# Patient Record
Sex: FEMALE | Race: WHITE | NOT HISPANIC OR LATINO | Employment: OTHER | URBAN - METROPOLITAN AREA
[De-identification: names, ages, dates, MRNs, and addresses within clinical notes are randomized per-mention and may not be internally consistent; named-entity substitution may affect disease eponyms.]

---

## 2024-01-21 DIAGNOSIS — B00.9 HSV INFECTION: Primary | ICD-10-CM

## 2024-01-25 RX ORDER — VALACYCLOVIR HYDROCHLORIDE 500 MG/1
TABLET, FILM COATED ORAL
Qty: 90 TABLET | Refills: 3 | Status: SHIPPED | OUTPATIENT
Start: 2024-01-25

## 2024-06-20 ENCOUNTER — APPOINTMENT (OUTPATIENT)
Dept: PAIN MEDICINE | Facility: CLINIC | Age: 59
End: 2024-06-20
Payer: MEDICARE

## 2024-06-20 VITALS
HEIGHT: 63 IN | RESPIRATION RATE: 17 BRPM | SYSTOLIC BLOOD PRESSURE: 122 MMHG | HEART RATE: 83 BPM | BODY MASS INDEX: 23.92 KG/M2 | DIASTOLIC BLOOD PRESSURE: 75 MMHG | WEIGHT: 135 LBS

## 2024-06-20 DIAGNOSIS — M06.9 RHEUMATOID ARTHRITIS INVOLVING BOTH HANDS, UNSPECIFIED WHETHER RHEUMATOID FACTOR PRESENT (MULTI): ICD-10-CM

## 2024-06-20 DIAGNOSIS — M79.2 NEUROPATHIC PAIN OF ANKLE, RIGHT: Primary | ICD-10-CM

## 2024-06-20 DIAGNOSIS — M72.2 PLANTAR FASCIITIS: ICD-10-CM

## 2024-06-20 DIAGNOSIS — M79.7 FIBROMYALGIA: ICD-10-CM

## 2024-06-20 PROCEDURE — 99204 OFFICE O/P NEW MOD 45 MIN: CPT | Performed by: PAIN MEDICINE

## 2024-06-20 RX ORDER — ATORVASTATIN CALCIUM 20 MG/1
20 TABLET, FILM COATED ORAL
COMMUNITY
Start: 2024-03-20

## 2024-06-20 RX ORDER — METFORMIN HYDROCHLORIDE 1000 MG/1
1000 TABLET ORAL 2 TIMES DAILY
COMMUNITY

## 2024-06-20 RX ORDER — SUMATRIPTAN 20 MG/1
SPRAY NASAL
COMMUNITY
Start: 2015-05-01

## 2024-06-20 RX ORDER — DEXTROAMPHETAMINE SACCHARATE, AMPHETAMINE ASPARTATE MONOHYDRATE, DEXTROAMPHETAMINE SULFATE AND AMPHETAMINE SULFATE 7.5; 7.5; 7.5; 7.5 MG/1; MG/1; MG/1; MG/1
30 CAPSULE, EXTENDED RELEASE ORAL
COMMUNITY
Start: 2024-06-15 | End: 2024-07-15

## 2024-06-20 RX ORDER — DICLOFENAC SODIUM 10 MG/G
GEL TOPICAL
COMMUNITY
Start: 2024-05-07

## 2024-06-20 RX ORDER — TRAZODONE HYDROCHLORIDE 300 MG/1
TABLET ORAL
COMMUNITY

## 2024-06-20 RX ORDER — PREGABALIN 75 MG/1
225 CAPSULE ORAL 2 TIMES DAILY
COMMUNITY
Start: 2024-05-14 | End: 2024-11-10

## 2024-06-20 RX ORDER — BREXPIPRAZOLE 3 MG/1
1 TABLET ORAL DAILY
COMMUNITY

## 2024-06-20 RX ORDER — CELECOXIB 200 MG/1
CAPSULE ORAL
COMMUNITY

## 2024-06-20 RX ORDER — DICLOFENAC SODIUM 75 MG/1
75 TABLET, DELAYED RELEASE ORAL
COMMUNITY
Start: 2019-05-29

## 2024-06-20 RX ORDER — DEXTROAMPHETAMINE SACCHARATE, AMPHETAMINE ASPARTATE MONOHYDRATE, DEXTROAMPHETAMINE SULFATE AND AMPHETAMINE SULFATE 2.5; 2.5; 2.5; 2.5 MG/1; MG/1; MG/1; MG/1
10 CAPSULE, EXTENDED RELEASE ORAL
COMMUNITY
Start: 2024-06-15 | End: 2024-07-15

## 2024-06-20 RX ORDER — MULTIVITAMIN
TABLET ORAL
COMMUNITY

## 2024-06-20 RX ORDER — OMEPRAZOLE 20 MG/1
CAPSULE, DELAYED RELEASE ORAL
COMMUNITY

## 2024-06-20 SDOH — SOCIAL STABILITY: SOCIAL NETWORK: SOCIAL ACTIVITY:: 7

## 2024-06-20 ASSESSMENT — PAIN SCALES - GENERAL
PAINLEVEL_OUTOF10: 9
PAINLEVEL: 9

## 2024-06-20 ASSESSMENT — ENCOUNTER SYMPTOMS: PAIN: 1

## 2024-06-20 ASSESSMENT — PAIN - FUNCTIONAL ASSESSMENT: PAIN_FUNCTIONAL_ASSESSMENT: 0-10

## 2024-06-20 NOTE — PROGRESS NOTES
Subjective   Patient ID: Syeda Rao is a 59 y.o. female who presents for Pain (Pt here today c/o pain in feet, hands and back. Recent images, no recent PT. ).  Pain      This is a 59-year-old female presenting as a new patient in our clinic today. Patient has history of rheumatoid arthritis as well as fibromyalgia. She complains of significant right ankle and leg pain as well as left foot pain in the sole. She had to ankle surgeries in the past 1 7 years ago and the most recent one being in January on the right ankle. She says that a second surgery was required due to entrapment of the nerve. Since the first surgery 7 years ago she was reporting numbness and tingling as well as significant sensitivity to touch and burning pain of the right ankle going into the dorsum of the foot as well as the toes. This is caused her significant distress as well as limited function in her daily life. As far as her left foot pain she says the pain is localized to the sole and has made ambulation difficult for her she does wear inserts in her shoes. She takes Lyrica 75 mg 3 times a day which she says does not help with her pain. She has not had any injections nor any physical therapy in the past for this.            Pain Disability Index  Family/Home Responsibilities:: 7  Recreation:: 9  Social Activity:: 7  Occupation:: 9  Sexual Behavior:: 9  Self Care:: 3  Life-Support Activities:: 2         Review of Systems   All other systems reviewed and are negative.       Current Outpatient Medications   Medication Instructions    amphetamine-dextroamphetamine XR (Adderall XR) 10 mg 24 hr capsule 10 mg, oral, Daily RT    amphetamine-dextroamphetamine XR (Adderall XR) 30 mg 24 hr capsule 30 mg, oral, Daily RT    atorvastatin (LIPITOR) 20 mg, oral, Daily RT    celecoxib (CeleBREX) 200 mg capsule TAKE ONE CAPSULE BY MOUTH TWICE DAILY @9AM & 5PM (vial)    diclofenac (VOLTAREN) 75 mg, oral    diclofenac sodium (Voltaren) 1 % gel apply 2  grams topically to affected area four times daily for 10 days    metFORMIN (GLUCOPHAGE) 1,000 mg, oral, 2 times daily    multivitamin tablet oral    omeprazole (PriLOSEC) 20 mg DR capsule TAKE ONE CAPSULE BY MOUTH DAILY AT 9AM (vial)    pregabalin (LYRICA) 225 mg, oral, 2 times daily    Rexulti 3 mg tablet 1 tablet, oral, Daily    SUMAtriptan (Imitrex) 20 mg/actuation nasal spray use 1 spray in the nose as needed.    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    Common variable hypogammaglobulinemia    Other somatoform disorders     Bruxism (teeth grinding)    Other specified noninflammatory disorders of vagina 05/01/2017    Vaginal odor    Other symptoms and signs involving the musculoskeletal system 07/11/2018    Bilateral arm weakness    Pain in right hand     Right hand pain    Periodic limb movement disorder     Periodic limb movement    Personal history of diseases of the skin and subcutaneous tissue     History of acne    Personal history of other diseases of the musculoskeletal system and connective tissue     History of arthritis    Personal history of other diseases of the musculoskeletal system and connective tissue     History of low back pain    Personal history of other diseases of the nervous system and sense organs 08/30/2018    History of benign essential tremor    Personal history of other diseases of the nervous system and sense organs     History of migraine    Personal history of other diseases of the nervous system and sense organs     History of conjunctivitis    Personal history of other diseases of the respiratory system     Personal history of chronic sinusitis    Personal history of other diseases of the respiratory system     History of acute  bronchitis    Personal history of other endocrine, nutritional and metabolic disease     History of high cholesterol    Personal history of other mental and behavioral disorders 10/07/2015    History of depression    Personal history of other specified conditions 07/11/2018    History of fatigue    Unspecified symptoms and signs involving the genitourinary system     UTI symptoms    Ventricular premature depolarization     PVC (premature ventricular contraction)        Past Surgical History:   Procedure Laterality Date    CT ANGIO NECK  12/24/2022    CT NECK ANGIO W AND WO IV CONTRAST 12/24/2022 [facility]    MR HEAD ANGIO WO IV CONTRAST  3/7/2018    MR HEAD ANGIO WO IV CONTRAST 3/7/2018 [facility]    OTHER SURGICAL HISTORY  03/19/2018    CO2 Laser Ablation Of Vaginal Lesion    OTHER SURGICAL HISTORY  10/18/2019    Cystoscopy    OTHER SURGICAL HISTORY  04/12/2019    Shave biopsy    OTHER SURGICAL HISTORY  03/11/2014    Transurethral Removal Of Obstruction Fr Ureter, Renal Pelvis        No family history on file.     No Known Allergies     Objective     Vitals:    06/20/24 1321   BP: 122/75   Pulse: 83   Resp: 17        Physical Exam    GENERAL EXAM  Vital Signs: Vital signs to include heart rate, respiration rate, blood pressure, and temperature were reviewed.  General Appearance:  Awake, alert, healthy appearing, well developed, No acute distress.  Head: Normocephalic without evidence of head injury.  Neck: The appearance of the neck was normal without swelling with a midline trachea.  Eyes: The eyelids and eyebrows exhibited no abnormalities.  Pupils were not pin-point.  Sclera was without icterus.  Lungs: Respiration rhythm and depth was normal.  Respiratory movements were normal without labored breathing.  Cardiovascular: No peripheral edema was present.    Neurological: Patient was oriented to time, place, and person.  Speech was normal.  Balance, gait, and stance were unremarkable.     Psychiatric: Appearance was normal with appropriate dress.  Mood was euthymic and affect was normal.  Skin: Affected regions were without ecchymosis or skin lesions.        Physical exam as above except:  No discolorations, swelling, temperature changes, or limited range of mobility of the right ankle compared to the left.  Significant sensitivity to touch along the surgical scar on the lateral malleolus up towards the distal fibular region.    In office injection:  After obtaining informed consent the patient was vision in the left lateral decubitus position. The surgical scar of the right malleolus was identified, prepped and draped in usual sterile fashion. A 25-gauge needle was introduced into the subcuticular layer of the surgical scar, and after negative aspiration 3 mL of 0.75% bupivacaine as well as 20 mg of Depo-Medrol was injected with no complications. Additionally, the left plantar fascia was palpated, draped and prepped in usual sterile fashion. A 25-gauge needle was injected along with 2 mL of 0.75% bupivacaine and 20 mg of Depo-Medrol.      Assessment/Plan   Diagnoses and all orders for this visit:  Neuropathic pain of ankle, right  Plantar fasciitis  Fibromyalgia  Rheumatoid arthritis involving both hands, unspecified whether rheumatoid factor present (Multi)  This is a 61-year-old female presenting as new patient in our clinic today. Patient has history of fibromyalgia, rheumatoid arthritis, neuropathic pain of the right ankle as well as pendular fasciitis of left foot. Patient underwent an in office injection of the right surgical scar in the ankle as well as left plantar fascia. The patient tolerated procedure well and endorsed complete relief of the pain along the surgical scar with some mild pain of the left plantar fascia. The plan is as follows:  1. Patient be started on Cymbalta 30-60 mg she was counseled on side effects this medication and she is agreeable  2. Patient was also referred to  physical therapy for both feet to help improve range of motion and overall pain symptoms         This note was generated with the aid of dictation software, there may be typos despite my attempts at proofreading.

## 2024-07-08 ENCOUNTER — APPOINTMENT (OUTPATIENT)
Dept: PAIN MEDICINE | Facility: CLINIC | Age: 59
End: 2024-07-08
Payer: MEDICARE

## 2024-11-13 DIAGNOSIS — D83.9 CVID (COMMON VARIABLE IMMUNODEFICIENCY): ICD-10-CM

## 2024-11-13 DIAGNOSIS — B99.9 RECURRENT INFECTIONS: Primary | ICD-10-CM

## 2025-02-03 ENCOUNTER — APPOINTMENT (OUTPATIENT)
Dept: RADIOLOGY | Facility: HOSPITAL | Age: 60
End: 2025-02-03
Payer: MEDICARE

## 2025-02-03 ENCOUNTER — HOSPITAL ENCOUNTER (EMERGENCY)
Facility: HOSPITAL | Age: 60
Discharge: HOME | End: 2025-02-03
Attending: EMERGENCY MEDICINE
Payer: MEDICARE

## 2025-02-03 ENCOUNTER — APPOINTMENT (OUTPATIENT)
Dept: CARDIOLOGY | Facility: HOSPITAL | Age: 60
End: 2025-02-03
Payer: MEDICARE

## 2025-02-03 VITALS
WEIGHT: 140 LBS | TEMPERATURE: 97.9 F | DIASTOLIC BLOOD PRESSURE: 74 MMHG | HEART RATE: 75 BPM | BODY MASS INDEX: 24.8 KG/M2 | HEIGHT: 63 IN | RESPIRATION RATE: 22 BRPM | OXYGEN SATURATION: 95 % | SYSTOLIC BLOOD PRESSURE: 128 MMHG

## 2025-02-03 DIAGNOSIS — R11.0 NAUSEA: Primary | ICD-10-CM

## 2025-02-03 DIAGNOSIS — N30.00 ACUTE CYSTITIS WITHOUT HEMATURIA: ICD-10-CM

## 2025-02-03 DIAGNOSIS — R10.30 LOWER ABDOMINAL PAIN: ICD-10-CM

## 2025-02-03 DIAGNOSIS — R52 BODY ACHES: ICD-10-CM

## 2025-02-03 LAB
ALBUMIN SERPL BCP-MCNC: 4.2 G/DL (ref 3.4–5)
ALP SERPL-CCNC: 117 U/L (ref 33–136)
ALT SERPL W P-5'-P-CCNC: 29 U/L (ref 7–45)
ANION GAP SERPL CALC-SCNC: 14 MMOL/L (ref 10–20)
APPEARANCE UR: CLEAR
AST SERPL W P-5'-P-CCNC: 31 U/L (ref 9–39)
BASOPHILS # BLD AUTO: 0.05 X10*3/UL (ref 0–0.1)
BASOPHILS NFR BLD AUTO: 0.6 %
BILIRUB SERPL-MCNC: 0.3 MG/DL (ref 0–1.2)
BILIRUB UR STRIP.AUTO-MCNC: NEGATIVE MG/DL
BNP SERPL-MCNC: 12 PG/ML (ref 0–99)
BUN SERPL-MCNC: 18 MG/DL (ref 6–23)
CALCIUM SERPL-MCNC: 9.9 MG/DL (ref 8.6–10.3)
CARDIAC TROPONIN I PNL SERPL HS: 5 NG/L (ref 0–13)
CARDIAC TROPONIN I PNL SERPL HS: 6 NG/L (ref 0–13)
CHLORIDE SERPL-SCNC: 104 MMOL/L (ref 98–107)
CO2 SERPL-SCNC: 26 MMOL/L (ref 21–32)
COLOR UR: YELLOW
CREAT SERPL-MCNC: 0.88 MG/DL (ref 0.5–1.05)
D DIMER PPP FEU-MCNC: 351 NG/ML FEU
EGFRCR SERPLBLD CKD-EPI 2021: 75 ML/MIN/1.73M*2
EOSINOPHIL # BLD AUTO: 0.29 X10*3/UL (ref 0–0.7)
EOSINOPHIL NFR BLD AUTO: 3.4 %
ERYTHROCYTE [DISTWIDTH] IN BLOOD BY AUTOMATED COUNT: 14.8 % (ref 11.5–14.5)
FLUAV RNA RESP QL NAA+PROBE: NOT DETECTED
FLUBV RNA RESP QL NAA+PROBE: NOT DETECTED
GLUCOSE SERPL-MCNC: 98 MG/DL (ref 74–99)
GLUCOSE UR STRIP.AUTO-MCNC: NEGATIVE MG/DL
HCT VFR BLD AUTO: 45.2 % (ref 36–46)
HGB BLD-MCNC: 14.6 G/DL (ref 12–16)
IMM GRANULOCYTES # BLD AUTO: 0.03 X10*3/UL (ref 0–0.7)
IMM GRANULOCYTES NFR BLD AUTO: 0.4 % (ref 0–0.9)
KETONES UR STRIP.AUTO-MCNC: NEGATIVE MG/DL
LEUKOCYTE ESTERASE UR QL STRIP.AUTO: ABNORMAL
LIPASE SERPL-CCNC: 100 U/L (ref 9–82)
LYMPHOCYTES # BLD AUTO: 2.1 X10*3/UL (ref 1.2–4.8)
LYMPHOCYTES NFR BLD AUTO: 24.6 %
MAGNESIUM SERPL-MCNC: 1.99 MG/DL (ref 1.6–2.4)
MCH RBC QN AUTO: 28.1 PG (ref 26–34)
MCHC RBC AUTO-ENTMCNC: 32.3 G/DL (ref 32–36)
MCV RBC AUTO: 87 FL (ref 80–100)
MONOCYTES # BLD AUTO: 0.6 X10*3/UL (ref 0.1–1)
MONOCYTES NFR BLD AUTO: 7 %
MUCOUS THREADS #/AREA URNS AUTO: ABNORMAL /LPF
NEUTROPHILS # BLD AUTO: 5.47 X10*3/UL (ref 1.2–7.7)
NEUTROPHILS NFR BLD AUTO: 64 %
NITRITE UR QL STRIP.AUTO: NEGATIVE
NRBC BLD-RTO: 0 /100 WBCS (ref 0–0)
PH UR STRIP.AUTO: 6 [PH]
PLATELET # BLD AUTO: 268 X10*3/UL (ref 150–450)
POTASSIUM SERPL-SCNC: 4.3 MMOL/L (ref 3.5–5.3)
PROT SERPL-MCNC: 7.4 G/DL (ref 6.4–8.2)
PROT UR STRIP.AUTO-MCNC: ABNORMAL MG/DL
RBC # BLD AUTO: 5.2 X10*6/UL (ref 4–5.2)
RBC # UR STRIP.AUTO: NEGATIVE /UL
RBC #/AREA URNS AUTO: ABNORMAL /HPF
SARS-COV-2 RNA RESP QL NAA+PROBE: NOT DETECTED
SODIUM SERPL-SCNC: 140 MMOL/L (ref 136–145)
SP GR UR STRIP.AUTO: 1.02
SQUAMOUS #/AREA URNS AUTO: ABNORMAL /HPF
UROBILINOGEN UR STRIP.AUTO-MCNC: ABNORMAL MG/DL
WBC # BLD AUTO: 8.5 X10*3/UL (ref 4.4–11.3)
WBC #/AREA URNS AUTO: ABNORMAL /HPF

## 2025-02-03 PROCEDURE — 36415 COLL VENOUS BLD VENIPUNCTURE: CPT

## 2025-02-03 PROCEDURE — 96374 THER/PROPH/DIAG INJ IV PUSH: CPT

## 2025-02-03 PROCEDURE — 99285 EMERGENCY DEPT VISIT HI MDM: CPT | Mod: 25 | Performed by: EMERGENCY MEDICINE

## 2025-02-03 PROCEDURE — 71045 X-RAY EXAM CHEST 1 VIEW: CPT

## 2025-02-03 PROCEDURE — 87086 URINE CULTURE/COLONY COUNT: CPT | Mod: GEALAB

## 2025-02-03 PROCEDURE — 84484 ASSAY OF TROPONIN QUANT: CPT

## 2025-02-03 PROCEDURE — 71045 X-RAY EXAM CHEST 1 VIEW: CPT | Performed by: RADIOLOGY

## 2025-02-03 PROCEDURE — 74177 CT ABD & PELVIS W/CONTRAST: CPT

## 2025-02-03 PROCEDURE — 84075 ASSAY ALKALINE PHOSPHATASE: CPT

## 2025-02-03 PROCEDURE — 87636 SARSCOV2 & INF A&B AMP PRB: CPT | Performed by: EMERGENCY MEDICINE

## 2025-02-03 PROCEDURE — 85379 FIBRIN DEGRADATION QUANT: CPT

## 2025-02-03 PROCEDURE — 93971 EXTREMITY STUDY: CPT | Performed by: RADIOLOGY

## 2025-02-03 PROCEDURE — 2550000001 HC RX 255 CONTRASTS

## 2025-02-03 PROCEDURE — 83735 ASSAY OF MAGNESIUM: CPT

## 2025-02-03 PROCEDURE — 73552 X-RAY EXAM OF FEMUR 2/>: CPT | Mod: LT

## 2025-02-03 PROCEDURE — 83880 ASSAY OF NATRIURETIC PEPTIDE: CPT

## 2025-02-03 PROCEDURE — 74177 CT ABD & PELVIS W/CONTRAST: CPT | Performed by: RADIOLOGY

## 2025-02-03 PROCEDURE — 93971 EXTREMITY STUDY: CPT

## 2025-02-03 PROCEDURE — 96376 TX/PRO/DX INJ SAME DRUG ADON: CPT

## 2025-02-03 PROCEDURE — 85025 COMPLETE CBC W/AUTO DIFF WBC: CPT

## 2025-02-03 PROCEDURE — 93005 ELECTROCARDIOGRAM TRACING: CPT

## 2025-02-03 PROCEDURE — 73630 X-RAY EXAM OF FOOT: CPT | Mod: LEFT SIDE | Performed by: RADIOLOGY

## 2025-02-03 PROCEDURE — 73630 X-RAY EXAM OF FOOT: CPT | Mod: LT

## 2025-02-03 PROCEDURE — 96361 HYDRATE IV INFUSION ADD-ON: CPT

## 2025-02-03 PROCEDURE — 81001 URINALYSIS AUTO W/SCOPE: CPT

## 2025-02-03 PROCEDURE — 2500000004 HC RX 250 GENERAL PHARMACY W/ HCPCS (ALT 636 FOR OP/ED): Mod: JZ | Performed by: EMERGENCY MEDICINE

## 2025-02-03 PROCEDURE — 2500000005 HC RX 250 GENERAL PHARMACY W/O HCPCS: Performed by: EMERGENCY MEDICINE

## 2025-02-03 PROCEDURE — 73552 X-RAY EXAM OF FEMUR 2/>: CPT | Mod: LEFT SIDE | Performed by: RADIOLOGY

## 2025-02-03 PROCEDURE — 2500000002 HC RX 250 W HCPCS SELF ADMINISTERED DRUGS (ALT 637 FOR MEDICARE OP, ALT 636 FOR OP/ED): Mod: MUE | Performed by: EMERGENCY MEDICINE

## 2025-02-03 PROCEDURE — 96375 TX/PRO/DX INJ NEW DRUG ADDON: CPT

## 2025-02-03 PROCEDURE — 83690 ASSAY OF LIPASE: CPT

## 2025-02-03 PROCEDURE — 2500000004 HC RX 250 GENERAL PHARMACY W/ HCPCS (ALT 636 FOR OP/ED): Mod: JZ

## 2025-02-03 RX ORDER — LIDOCAINE 560 MG/1
1 PATCH PERCUTANEOUS; TOPICAL; TRANSDERMAL DAILY
Qty: 5 PATCH | Refills: 0 | Status: SHIPPED | OUTPATIENT
Start: 2025-02-03 | End: 2025-02-08

## 2025-02-03 RX ORDER — METOCLOPRAMIDE HYDROCHLORIDE 5 MG/ML
10 INJECTION INTRAMUSCULAR; INTRAVENOUS ONCE
Status: COMPLETED | OUTPATIENT
Start: 2025-02-03 | End: 2025-02-03

## 2025-02-03 RX ORDER — DIPHENHYDRAMINE HYDROCHLORIDE 50 MG/ML
25 INJECTION INTRAMUSCULAR; INTRAVENOUS ONCE
Status: COMPLETED | OUTPATIENT
Start: 2025-02-03 | End: 2025-02-03

## 2025-02-03 RX ORDER — HYDROMORPHONE HYDROCHLORIDE 1 MG/ML
1 INJECTION, SOLUTION INTRAMUSCULAR; INTRAVENOUS; SUBCUTANEOUS ONCE
Status: COMPLETED | OUTPATIENT
Start: 2025-02-03 | End: 2025-02-03

## 2025-02-03 RX ORDER — ONDANSETRON HYDROCHLORIDE 2 MG/ML
4 INJECTION, SOLUTION INTRAVENOUS ONCE
Status: COMPLETED | OUTPATIENT
Start: 2025-02-03 | End: 2025-02-03

## 2025-02-03 RX ORDER — NITROFURANTOIN 25; 75 MG/1; MG/1
100 CAPSULE ORAL 2 TIMES DAILY
Qty: 14 CAPSULE | Refills: 0 | Status: SHIPPED | OUTPATIENT
Start: 2025-02-03 | End: 2025-02-10

## 2025-02-03 RX ORDER — LIDOCAINE 560 MG/1
1 PATCH PERCUTANEOUS; TOPICAL; TRANSDERMAL ONCE
Status: DISCONTINUED | OUTPATIENT
Start: 2025-02-03 | End: 2025-02-04 | Stop reason: HOSPADM

## 2025-02-03 RX ORDER — LIDOCAINE 560 MG/1
1 PATCH PERCUTANEOUS; TOPICAL; TRANSDERMAL DAILY
Status: DISCONTINUED | OUTPATIENT
Start: 2025-02-04 | End: 2025-02-03

## 2025-02-03 RX ORDER — NITROFURANTOIN 25; 75 MG/1; MG/1
100 CAPSULE ORAL ONCE
Status: COMPLETED | OUTPATIENT
Start: 2025-02-03 | End: 2025-02-03

## 2025-02-03 RX ORDER — KETOROLAC TROMETHAMINE 15 MG/ML
15 INJECTION, SOLUTION INTRAMUSCULAR; INTRAVENOUS ONCE
Status: COMPLETED | OUTPATIENT
Start: 2025-02-03 | End: 2025-02-03

## 2025-02-03 RX ADMIN — HYDROMORPHONE HYDROCHLORIDE 1 MG: 1 INJECTION, SOLUTION INTRAMUSCULAR; INTRAVENOUS; SUBCUTANEOUS at 19:38

## 2025-02-03 RX ADMIN — HYDROMORPHONE HYDROCHLORIDE 1 MG: 1 INJECTION, SOLUTION INTRAMUSCULAR; INTRAVENOUS; SUBCUTANEOUS at 22:45

## 2025-02-03 RX ADMIN — KETOROLAC TROMETHAMINE 15 MG: 15 INJECTION, SOLUTION INTRAMUSCULAR; INTRAVENOUS at 17:23

## 2025-02-03 RX ADMIN — METOCLOPRAMIDE 10 MG: 5 INJECTION, SOLUTION INTRAMUSCULAR; INTRAVENOUS at 22:44

## 2025-02-03 RX ADMIN — HYDROMORPHONE HYDROCHLORIDE 0.5 MG: 1 INJECTION, SOLUTION INTRAMUSCULAR; INTRAVENOUS; SUBCUTANEOUS at 18:33

## 2025-02-03 RX ADMIN — SODIUM CHLORIDE 500 ML: 9 INJECTION, SOLUTION INTRAVENOUS at 17:26

## 2025-02-03 RX ADMIN — SODIUM CHLORIDE 1000 ML: 9 INJECTION, SOLUTION INTRAVENOUS at 19:38

## 2025-02-03 RX ADMIN — NITROFURANTOIN MONOHYDRATE/MACROCRYSTALS 100 MG: 25; 75 CAPSULE ORAL at 22:43

## 2025-02-03 RX ADMIN — DIPHENHYDRAMINE HYDROCHLORIDE 25 MG: 50 INJECTION, SOLUTION INTRAMUSCULAR; INTRAVENOUS at 22:44

## 2025-02-03 RX ADMIN — LIDOCAINE 1 PATCH: 4 PATCH TOPICAL at 22:46

## 2025-02-03 RX ADMIN — IOHEXOL 75 ML: 350 INJECTION, SOLUTION INTRAVENOUS at 17:58

## 2025-02-03 RX ADMIN — ONDANSETRON 4 MG: 2 INJECTION, SOLUTION INTRAMUSCULAR; INTRAVENOUS at 17:21

## 2025-02-03 ASSESSMENT — PAIN SCALES - GENERAL
PAINLEVEL_OUTOF10: 0 - NO PAIN
PAINLEVEL_OUTOF10: 9
PAINLEVEL_OUTOF10: 3
PAINLEVEL_OUTOF10: 6
PAINLEVEL_OUTOF10: 4
PAINLEVEL_OUTOF10: 8
PAINLEVEL_OUTOF10: 8

## 2025-02-03 ASSESSMENT — PAIN DESCRIPTION - ORIENTATION
ORIENTATION: LOWER
ORIENTATION: LOWER

## 2025-02-03 ASSESSMENT — PAIN DESCRIPTION - LOCATION
LOCATION: GENERALIZED
LOCATION: BACK

## 2025-02-03 ASSESSMENT — PAIN - FUNCTIONAL ASSESSMENT: PAIN_FUNCTIONAL_ASSESSMENT: 0-10

## 2025-02-03 ASSESSMENT — COLUMBIA-SUICIDE SEVERITY RATING SCALE - C-SSRS
2. HAVE YOU ACTUALLY HAD ANY THOUGHTS OF KILLING YOURSELF?: NO
1. IN THE PAST MONTH, HAVE YOU WISHED YOU WERE DEAD OR WISHED YOU COULD GO TO SLEEP AND NOT WAKE UP?: NO
6. HAVE YOU EVER DONE ANYTHING, STARTED TO DO ANYTHING, OR PREPARED TO DO ANYTHING TO END YOUR LIFE?: NO

## 2025-02-03 NOTE — ED TRIAGE NOTES
Pt BIB POV from home w/ c/o migraine 4 days, uncontrollable sweating, left upper leg cramp, believes she has a left great toe infection s/t redness/swelling/pain, also c/o severe low back pain wrapping left and right to abdomen. Nausea but no vomiting. Pt states her equilibrium feels off.

## 2025-02-03 NOTE — ED PROVIDER NOTES
HPI   Chief Complaint   Patient presents with    URI       Patient is a 60-year-old female with significant PMH of diabetes presents to the ED for multiple complaints.  Patient states she went to the urgent care 4 days ago and found to have a UTI started on Keflex.  Patient states tonight around she had night sweats and did not sleep for 48 hours and then she slept for 48 hours after.  Patient states she has sinus pressure congestion.  Unsure of any fevers.  Patient denies any chest pain endorses short of breath.  Denies any history of asthma.  Patient states she has lower abdominal pain that radiates into her back.  Patient denies any history of MI blood clots recent travel or surgery.  Denies any dysuria hematuria urinary frequency urgency.  Patient endorses left flank pain and states she needs a left knee replacement.  Patient also states she has a left toe pain and noticed a red dot.  Patient denies any injury to the area.  Patient smokes 8 cigarettes a day denies any alcohol or street drug abuse.              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    Common variable hypogammaglobulinemia    Other somatoform disorders     Bruxism (teeth grinding)    Other specified noninflammatory disorders of vagina 05/01/2017    Vaginal odor    Other symptoms and signs involving the musculoskeletal system 07/11/2018    Bilateral arm weakness    Pain in right hand     Right hand pain    Periodic limb movement disorder     Periodic limb movement    Personal history of diseases of the skin and subcutaneous tissue     History of acne    Personal history of other diseases of the musculoskeletal system and connective tissue     History of arthritis    Personal history of other diseases of the musculoskeletal system and connective tissue     History of low back  pain    Personal history of other diseases of the nervous system and sense organs 08/30/2018    History of benign essential tremor    Personal history of other diseases of the nervous system and sense organs     History of migraine    Personal history of other diseases of the nervous system and sense organs     History of conjunctivitis    Personal history of other diseases of the respiratory system     Personal history of chronic sinusitis    Personal history of other diseases of the respiratory system     History of acute bronchitis    Personal history of other endocrine, nutritional and metabolic disease     History of high cholesterol    Personal history of other mental and behavioral disorders 10/07/2015    History of depression    Personal history of other specified conditions 07/11/2018    History of fatigue    Unspecified symptoms and signs involving the genitourinary system     UTI symptoms    Ventricular premature depolarization     PVC (premature ventricular contraction)     Past Surgical History:   Procedure Laterality Date    CT ANGIO NECK  12/24/2022    CT NECK ANGIO W AND WO IV CONTRAST 12/24/2022 [facility]    MR HEAD ANGIO WO IV CONTRAST  3/7/2018    MR HEAD ANGIO WO IV CONTRAST 3/7/2018 [facility]    OTHER SURGICAL HISTORY  03/19/2018    CO2 Laser Ablation Of Vaginal Lesion    OTHER SURGICAL HISTORY  10/18/2019    Cystoscopy    OTHER SURGICAL HISTORY  04/12/2019    Shave biopsy    OTHER SURGICAL HISTORY  03/11/2014    Transurethral Removal Of Obstruction Fr Ureter, Renal Pelvis     No family history on file.  Social History     Tobacco Use    Smoking status: Every Day     Current packs/day: 0.50     Average packs/day: 0.5 packs/day for 48.1 years (24.0 ttl pk-yrs)     Types: Cigarettes     Start date: 1977    Smokeless tobacco: Never   Vaping Use    Vaping status: Never Used   Substance Use Topics    Alcohol use: Yes    Drug use: Never       Physical Exam   ED Triage Vitals  [02/03/25 1629]   Temperature Heart Rate Respirations BP   36.6 °C (97.9 °F) 95 18 150/86      Pulse Ox Temp Source Heart Rate Source Patient Position   99 % Temporal Monitor Sitting      BP Location FiO2 (%)     Left arm --       Physical Exam  HENT:      Head: Normocephalic.   Cardiovascular:      Rate and Rhythm: Normal rate and regular rhythm.      Pulses: Normal pulses.   Pulmonary:      Effort: Pulmonary effort is normal.   Abdominal:      Palpations: Abdomen is soft.      Tenderness: There is no abdominal tenderness. There is no guarding or rebound.   Musculoskeletal:      Cervical back: Normal range of motion.   Skin:     Findings: No rash.   Neurological:      General: No focal deficit present.      Mental Status: She is alert and oriented to person, place, and time. Mental status is at baseline.           ED Course & MDM   ED Course as of 02/04/25 1202   Mon Feb 03, 2025 2233 The ultrasound did not show blood clot.  I talked to the patient I differential I suspect this is viral in nature.  She requested some additional medicine because she is getting a headache as well.  She be given antibiotics for possible UTI and discharged home.  OARRS reviewed [RZ]      ED Course User Index  [RZ] Edin Wolfe MD         Diagnoses as of 02/04/25 1202   Nausea   Lower abdominal pain   Body aches   Acute cystitis without hematuria                 No data recorded     [place] Coma Scale Score: 15 (02/03/25 1628 : Israel Ni RN)                           Medical Decision Making  Medical Decision Making:  Patient presented as described in HPI. Patient case including ROS, PE, and treatment and plan discussed with ED attending if attached as cosigner. Due to patients presentation orders completed include as documented.  Patient presents to the ED for multiple complaints.  Patient endorses night sweats last 2 nights congestion abdominal pain nausea.  Patient given fluids Toradol and Zofran.  Pending labs and  imaging.  Lipase is 100.  D-dimer is negative.  Rest of labs are unremarkable.  X-ray of the chest is negative.  Pending CT abdomen pelvis.  Care continued by ER attending and will be disposed once results return.        Patient care discussed with: N/A  Social Determinants affecting care: N/A    Final diagnosis and disposition as below.  See CI      This note has been transcribed using voice recognition and may contain grammatical errors, misplaced words, incorrect words, incorrect phrases or other errors.        Labs Reviewed   CBC WITH AUTO DIFFERENTIAL - Abnormal       Result Value    WBC 8.5      nRBC 0.0      RBC 5.20      Hemoglobin 14.6      Hematocrit 45.2      MCV 87      MCH 28.1      MCHC 32.3      RDW 14.8 (*)     Platelets 268      Neutrophils % 64.0      Immature Granulocytes %, Automated 0.4      Lymphocytes % 24.6      Monocytes % 7.0      Eosinophils % 3.4      Basophils % 0.6      Neutrophils Absolute 5.47      Immature Granulocytes Absolute, Automated 0.03      Lymphocytes Absolute 2.10      Monocytes Absolute 0.60      Eosinophils Absolute 0.29      Basophils Absolute 0.05     LIPASE - Abnormal    Lipase 100 (*)     Narrative:     Venipuncture immediately after or during the administration of Metamizole may lead to falsely low results. Testing should be performed immediately prior to Metamizole dosing.   SARS-COV-2 AND INFLUENZA A/B PCR - Normal    Flu A Result Not Detected      Flu B Result Not Detected      Coronavirus 2019, PCR Not Detected      Narrative:     This assay is an FDA-cleared, in vitro diagnostic nucleic acid amplification test for the qualitative detection and differentiation of SARS CoV-2/ Influenza A/B from nasopharyngeal specimens collected from individuals with signs and symptoms of respiratory tract infections, and has been validated for use at [facility]. Negative results do not preclude COVID-19/ Influenza A/B infections and should not be used as  the sole basis for diagnosis, treatment, or other management decisions. Testing for SARS CoV-2 is recommended only for patients who meet current clinical and/or epidemiological criteria defined by federal, state, or local public health directives.   COMPREHENSIVE METABOLIC PANEL - Normal    Glucose 98      Sodium 140      Potassium 4.3      Chloride 104      Bicarbonate 26      Anion Gap 14      Urea Nitrogen 18      Creatinine 0.88      eGFR 75      Calcium 9.9      Albumin 4.2      Alkaline Phosphatase 117      Total Protein 7.4      AST 31      Bilirubin, Total 0.3      ALT 29     MAGNESIUM - Normal    Magnesium 1.99     B-TYPE NATRIURETIC PEPTIDE - Normal    BNP 12      Narrative:        <100 pg/mL - Heart failure unlikely  100-299 pg/mL - Intermediate probability of acute heart                  failure exacerbation. Correlate with clinical                  context and patient history.    >=300 pg/mL - Heart Failure likely. Correlate with clinical                  context and patient history.    BNP testing is performed using different testing methodology at [facility] than at other [facility]. Direct result comparisons should only be made within the same method.      D-DIMER, VTE EXCLUSION - Normal    D-Dimer, Quantitative VTE Exclusion 351      Narrative:     The VTE Exclusion D-Dimer assay is reported in ng/mL Fibrinogen Equivalent Units (FEU).    Per 's instructions for use, a value of less than 500 ng/mL (FEU) may help to exclude DVT or PE in outpatients when the assay is used with a clinical pretest probability assessment.([facility] must utilize and document eCalc 'Wells Score Deep Vein Thrombosis Risk' for DVT exclusion only. Emergency Department should utilize  Guidelines for Emergency Department Use of the VTE Exclusion D-Dimer and Clinical Pretest probability assessment model for DVT or PE exclusion.)   SERIAL TROPONIN-INITIAL - Normal    Troponin I, High Sensitivity 5       Narrative:     Less than 99th percentile of normal range cutoff-  Female and children under 18 years old <14 ng/L; Male <21 ng/L: Negative  Repeat testing should be performed if clinically indicated.     Female and children under 18 years old 14-50 ng/L; Male 21-50 ng/L:  Consistent with possible cardiac damage and possible increased clinical   risk. Serial measurements may help to assess extent of myocardial damage.     >50 ng/L: Consistent with cardiac damage, increased clinical risk and  myocardial infarction. Serial measurements may help assess extent of   myocardial damage.      NOTE: Children less than 1 year old may have higher baseline troponin   levels and results should be interpreted in conjunction with the overall   clinical context.     NOTE: Troponin I testing is performed using a different   testing methodology at [facility] than at other   [facility]. Direct result comparisons should only   be made within the same method.   URINALYSIS WITH REFLEX CULTURE AND MICROSCOPIC    Narrative:     The following orders were created for panel order Urinalysis with Reflex Culture and Microscopic.  Procedure                               Abnormality         Status                     ---------                               -----------         ------                     Urinalysis with Reflex C...[[id number]]                                                 Extra Urine Gray Tube[[id number]]                                                         Please view results for these tests on the individual orders.   TROPONIN SERIES- (INITIAL, 1 HR)    Narrative:     The following orders were created for panel order Troponin I Series, High Sensitivity (0, 1 HR).  Procedure                               Abnormality         Status                     ---------                               -----------         ------                     Troponin I, High Sensiti...[[id number]]  Normal              Final result                Troponin, High Sensitivi...[[id number]]                                                   Please view results for these tests on the individual orders.   URINALYSIS WITH REFLEX CULTURE AND MICROSCOPIC   EXTRA URINE GRAY TUBE   SERIAL TROPONIN, 1 HOUR      XR chest 1 view   Final Result   1.  No evidence of acute cardiopulmonary process.                  MACRO:   None        Signed by: Charli Danielle 2/3/2025 5:59 PM   Dictation workstation:   [id number]      XR foot left 3+ views   Final Result   No acute abnormality in the left foot.             MACRO:   None        Signed by: Charli Danielle 2/3/2025 5:31 PM   Dictation workstation:   [id number]      CT abdomen pelvis w IV contrast    (Results Pending)        Procedure  Procedures     Luci Ordoñez PA-C  02/03/25 1823       Luci Ordoñez PA-C  02/03/25 1838       Luci Ordoñez PA-C  02/04/25 1202

## 2025-02-04 LAB
ATRIAL RATE: 77 BPM
HOLD SPECIMEN: NORMAL
P AXIS: 67 DEGREES
P OFFSET: 208 MS
P ONSET: 152 MS
PR INTERVAL: 152 MS
Q ONSET: 228 MS
QRS COUNT: 12 BEATS
QRS DURATION: 84 MS
QT INTERVAL: 410 MS
QTC CALCULATION(BAZETT): 463 MS
QTC FREDERICIA: 445 MS
R AXIS: 22 DEGREES
T AXIS: 40 DEGREES
T OFFSET: 433 MS
VENTRICULAR RATE: 77 BPM

## 2025-02-04 NOTE — ED PROVIDER NOTES
The patient was seen by the midlevel/resident.  I have personally saw the patient and made/approved the management plan and take responsibility for the patient management.  I reviewed the EKG's (when done) and agree with the interpretation.  I have seen and examined the patient; agree with the workup, evaluation, MDM, and diagnosis.  The care plan has been discussed with the midlevel/resident; I have reviewed the note and agree with the documented findings.     Patient was worked up and has multiple complaints.  She has some left leg pain has been hurting for over a month.  She has some back pain.  She was worked up with multiple imaging tests and labs.  We did not find a cause of her symptoms.  She tells been coughing for a number of days.  She may have a viral illness.  Plan is to complete her workup with an ultrasound of her left leg and attempt to get some urine after giving additional fluids.  She was also given some additional pain medicine for her lower back pain.  ED Course as of 02/03/25 2233   Mon Feb 03, 2025 2233 The ultrasound did not show blood clot.  I talked to the patient I differential I suspect this is viral in nature.  She requested some additional medicine because she is getting a headache as well.  She be given antibiotics for possible UTI and discharged home.  OARRS reviewed [RZ]      ED Course User Index  [RZ] Edin Wolfe MD         Diagnoses as of 02/03/25 2233   Nausea   Lower abdominal pain   Body aches   Acute cystitis without hematuria     MD Edin Zavala MD  02/03/25 2234

## 2025-02-05 LAB — BACTERIA UR CULT: NORMAL

## 2025-02-11 LAB
ATRIAL RATE: 77 BPM
P AXIS: 67 DEGREES
P OFFSET: 208 MS
P ONSET: 152 MS
PR INTERVAL: 152 MS
Q ONSET: 228 MS
QRS COUNT: 12 BEATS
QRS DURATION: 84 MS
QT INTERVAL: 410 MS
QTC CALCULATION(BAZETT): 463 MS
QTC FREDERICIA: 445 MS
R AXIS: 22 DEGREES
T AXIS: 40 DEGREES
T OFFSET: 433 MS
VENTRICULAR RATE: 77 BPM

## 2025-02-22 ENCOUNTER — APPOINTMENT (OUTPATIENT)
Dept: RADIOLOGY | Facility: HOSPITAL | Age: 60
End: 2025-02-22
Payer: MEDICARE

## 2025-02-22 ENCOUNTER — HOSPITAL ENCOUNTER (OUTPATIENT)
Dept: CARDIOLOGY | Facility: HOSPITAL | Age: 60
Discharge: HOME | End: 2025-02-22
Payer: MEDICARE

## 2025-02-22 ENCOUNTER — APPOINTMENT (OUTPATIENT)
Dept: CARDIOLOGY | Facility: HOSPITAL | Age: 60
End: 2025-02-22
Payer: MEDICARE

## 2025-02-22 ENCOUNTER — HOSPITAL ENCOUNTER (EMERGENCY)
Facility: HOSPITAL | Age: 60
Discharge: HOME | End: 2025-02-23
Attending: STUDENT IN AN ORGANIZED HEALTH CARE EDUCATION/TRAINING PROGRAM
Payer: MEDICARE

## 2025-02-22 DIAGNOSIS — R11.0 NAUSEA: ICD-10-CM

## 2025-02-22 DIAGNOSIS — N39.0 URINARY TRACT INFECTION WITH HEMATURIA, SITE UNSPECIFIED: ICD-10-CM

## 2025-02-22 DIAGNOSIS — R40.0 SOMNOLENCE: Primary | ICD-10-CM

## 2025-02-22 DIAGNOSIS — R31.9 URINARY TRACT INFECTION WITH HEMATURIA, SITE UNSPECIFIED: ICD-10-CM

## 2025-02-22 LAB
ALBUMIN SERPL BCP-MCNC: 4.1 G/DL (ref 3.4–5)
ALP SERPL-CCNC: 109 U/L (ref 33–136)
ALT SERPL W P-5'-P-CCNC: 25 U/L (ref 7–45)
AMPHETAMINES UR QL SCN: ABNORMAL
ANION GAP BLDV CALCULATED.4IONS-SCNC: 5 MMOL/L (ref 10–25)
ANION GAP SERPL CALC-SCNC: 13 MMOL/L (ref 10–20)
APAP SERPL-MCNC: <10 UG/ML
APPEARANCE UR: CLEAR
AST SERPL W P-5'-P-CCNC: 25 U/L (ref 9–39)
BARBITURATES UR QL SCN: ABNORMAL
BASE EXCESS BLDV CALC-SCNC: 2.8 MMOL/L (ref -2–3)
BASOPHILS # BLD AUTO: 0.04 X10*3/UL (ref 0–0.1)
BASOPHILS NFR BLD AUTO: 0.7 %
BENZODIAZ UR QL SCN: ABNORMAL
BILIRUB SERPL-MCNC: 0.3 MG/DL (ref 0–1.2)
BILIRUB UR STRIP.AUTO-MCNC: NEGATIVE MG/DL
BNP SERPL-MCNC: 39 PG/ML (ref 0–99)
BODY TEMPERATURE: ABNORMAL
BUN SERPL-MCNC: 16 MG/DL (ref 6–23)
BZE UR QL SCN: ABNORMAL
CA-I BLDV-SCNC: 1.21 MMOL/L (ref 1.1–1.33)
CALCIUM SERPL-MCNC: 9.8 MG/DL (ref 8.6–10.3)
CANNABINOIDS UR QL SCN: ABNORMAL
CARDIAC TROPONIN I PNL SERPL HS: 4 NG/L (ref 0–13)
CARDIAC TROPONIN I PNL SERPL HS: 5 NG/L (ref 0–13)
CHLORIDE BLDV-SCNC: 107 MMOL/L (ref 98–107)
CHLORIDE SERPL-SCNC: 103 MMOL/L (ref 98–107)
CO2 SERPL-SCNC: 26 MMOL/L (ref 21–32)
COLOR UR: ABNORMAL
CREAT SERPL-MCNC: 0.84 MG/DL (ref 0.5–1.05)
EGFRCR SERPLBLD CKD-EPI 2021: 79 ML/MIN/1.73M*2
EOSINOPHIL # BLD AUTO: 0.09 X10*3/UL (ref 0–0.7)
EOSINOPHIL NFR BLD AUTO: 1.5 %
ERYTHROCYTE [DISTWIDTH] IN BLOOD BY AUTOMATED COUNT: 14.6 % (ref 11.5–14.5)
ETHANOL SERPL-MCNC: <10 MG/DL
FENTANYL+NORFENTANYL UR QL SCN: ABNORMAL
GLUCOSE BLD MANUAL STRIP-MCNC: 117 MG/DL (ref 74–99)
GLUCOSE BLDV-MCNC: 105 MG/DL (ref 74–99)
GLUCOSE SERPL-MCNC: 100 MG/DL (ref 74–99)
GLUCOSE UR STRIP.AUTO-MCNC: NORMAL MG/DL
HCO3 BLDV-SCNC: 28.5 MMOL/L (ref 22–26)
HCT VFR BLD AUTO: 41.7 % (ref 36–46)
HCT VFR BLD EST: 42 % (ref 36–46)
HGB BLD-MCNC: 13.6 G/DL (ref 12–16)
HGB BLDV-MCNC: 14 G/DL (ref 12–16)
IMM GRANULOCYTES # BLD AUTO: 0.01 X10*3/UL (ref 0–0.7)
IMM GRANULOCYTES NFR BLD AUTO: 0.2 % (ref 0–0.9)
INHALED O2 CONCENTRATION: 21 %
KETONES UR STRIP.AUTO-MCNC: NEGATIVE MG/DL
LACTATE BLDV-SCNC: 1.2 MMOL/L (ref 0.4–2)
LEUKOCYTE ESTERASE UR QL STRIP.AUTO: ABNORMAL
LYMPHOCYTES # BLD AUTO: 1.57 X10*3/UL (ref 1.2–4.8)
LYMPHOCYTES NFR BLD AUTO: 26.8 %
MAGNESIUM SERPL-MCNC: 2 MG/DL (ref 1.6–2.4)
MCH RBC QN AUTO: 28.5 PG (ref 26–34)
MCHC RBC AUTO-ENTMCNC: 32.6 G/DL (ref 32–36)
MCV RBC AUTO: 87 FL (ref 80–100)
METHADONE UR QL SCN: ABNORMAL
MONOCYTES # BLD AUTO: 0.43 X10*3/UL (ref 0.1–1)
MONOCYTES NFR BLD AUTO: 7.4 %
MUCOUS THREADS #/AREA URNS AUTO: NORMAL /LPF
NEUTROPHILS # BLD AUTO: 3.71 X10*3/UL (ref 1.2–7.7)
NEUTROPHILS NFR BLD AUTO: 63.4 %
NITRITE UR QL STRIP.AUTO: ABNORMAL
NRBC BLD-RTO: 0 /100 WBCS (ref 0–0)
OPIATES UR QL SCN: ABNORMAL
OXYCODONE+OXYMORPHONE UR QL SCN: ABNORMAL
OXYHGB MFR BLDV: 67.9 % (ref 45–75)
PCO2 BLDV: 47 MM HG (ref 41–51)
PCP UR QL SCN: ABNORMAL
PH BLDV: 7.39 PH (ref 7.33–7.43)
PH UR STRIP.AUTO: 6.5 [PH]
PLATELET # BLD AUTO: 223 X10*3/UL (ref 150–450)
PO2 BLDV: 41 MM HG (ref 35–45)
POTASSIUM BLDV-SCNC: 3.5 MMOL/L (ref 3.5–5.3)
POTASSIUM SERPL-SCNC: 3.5 MMOL/L (ref 3.5–5.3)
PROT SERPL-MCNC: 8.1 G/DL (ref 6.4–8.2)
PROT UR STRIP.AUTO-MCNC: NEGATIVE MG/DL
RBC # BLD AUTO: 4.78 X10*6/UL (ref 4–5.2)
RBC # UR STRIP.AUTO: NEGATIVE MG/DL
RBC #/AREA URNS AUTO: NORMAL /HPF
SALICYLATES SERPL-MCNC: <3 MG/DL
SAO2 % BLDV: 73 % (ref 45–75)
SODIUM BLDV-SCNC: 137 MMOL/L (ref 136–145)
SODIUM SERPL-SCNC: 138 MMOL/L (ref 136–145)
SP GR UR STRIP.AUTO: 1.01
UROBILINOGEN UR STRIP.AUTO-MCNC: NORMAL MG/DL
WBC # BLD AUTO: 5.9 X10*3/UL (ref 4.4–11.3)
WBC #/AREA URNS AUTO: NORMAL /HPF

## 2025-02-22 PROCEDURE — P9612 CATHETERIZE FOR URINE SPEC: HCPCS

## 2025-02-22 PROCEDURE — 71045 X-RAY EXAM CHEST 1 VIEW: CPT | Mod: FOREIGN READ | Performed by: RADIOLOGY

## 2025-02-22 PROCEDURE — 85025 COMPLETE CBC W/AUTO DIFF WBC: CPT

## 2025-02-22 PROCEDURE — 84132 ASSAY OF SERUM POTASSIUM: CPT | Mod: 59

## 2025-02-22 PROCEDURE — 71275 CT ANGIOGRAPHY CHEST: CPT

## 2025-02-22 PROCEDURE — 74174 CTA ABD&PLVS W/CONTRAST: CPT

## 2025-02-22 PROCEDURE — 83880 ASSAY OF NATRIURETIC PEPTIDE: CPT

## 2025-02-22 PROCEDURE — 99285 EMERGENCY DEPT VISIT HI MDM: CPT | Mod: 25 | Performed by: STUDENT IN AN ORGANIZED HEALTH CARE EDUCATION/TRAINING PROGRAM

## 2025-02-22 PROCEDURE — 80320 DRUG SCREEN QUANTALCOHOLS: CPT

## 2025-02-22 PROCEDURE — 80307 DRUG TEST PRSMV CHEM ANLYZR: CPT

## 2025-02-22 PROCEDURE — 84484 ASSAY OF TROPONIN QUANT: CPT

## 2025-02-22 PROCEDURE — 80143 DRUG ASSAY ACETAMINOPHEN: CPT

## 2025-02-22 PROCEDURE — 2500000004 HC RX 250 GENERAL PHARMACY W/ HCPCS (ALT 636 FOR OP/ED): Performed by: STUDENT IN AN ORGANIZED HEALTH CARE EDUCATION/TRAINING PROGRAM

## 2025-02-22 PROCEDURE — 82947 ASSAY GLUCOSE BLOOD QUANT: CPT

## 2025-02-22 PROCEDURE — 36415 COLL VENOUS BLD VENIPUNCTURE: CPT

## 2025-02-22 PROCEDURE — 96372 THER/PROPH/DIAG INJ SC/IM: CPT | Performed by: STUDENT IN AN ORGANIZED HEALTH CARE EDUCATION/TRAINING PROGRAM

## 2025-02-22 PROCEDURE — 71045 X-RAY EXAM CHEST 1 VIEW: CPT

## 2025-02-22 PROCEDURE — 96366 THER/PROPH/DIAG IV INF ADDON: CPT

## 2025-02-22 PROCEDURE — 93005 ELECTROCARDIOGRAM TRACING: CPT

## 2025-02-22 PROCEDURE — 80053 COMPREHEN METABOLIC PANEL: CPT

## 2025-02-22 PROCEDURE — 96375 TX/PRO/DX INJ NEW DRUG ADDON: CPT | Mod: 59

## 2025-02-22 PROCEDURE — 70450 CT HEAD/BRAIN W/O DYE: CPT | Performed by: SURGERY

## 2025-02-22 PROCEDURE — 87086 URINE CULTURE/COLONY COUNT: CPT | Mod: GEALAB

## 2025-02-22 PROCEDURE — 81001 URINALYSIS AUTO W/SCOPE: CPT | Mod: 59

## 2025-02-22 PROCEDURE — 83735 ASSAY OF MAGNESIUM: CPT

## 2025-02-22 PROCEDURE — 96365 THER/PROPH/DIAG IV INF INIT: CPT

## 2025-02-22 PROCEDURE — 82435 ASSAY OF BLOOD CHLORIDE: CPT | Mod: 59

## 2025-02-22 PROCEDURE — 70450 CT HEAD/BRAIN W/O DYE: CPT

## 2025-02-22 RX ORDER — ORPHENADRINE CITRATE 30 MG/ML
60 INJECTION INTRAMUSCULAR; INTRAVENOUS ONCE
Status: COMPLETED | OUTPATIENT
Start: 2025-02-22 | End: 2025-02-22

## 2025-02-22 RX ORDER — METOCLOPRAMIDE HYDROCHLORIDE 5 MG/ML
10 INJECTION INTRAMUSCULAR; INTRAVENOUS ONCE
Status: COMPLETED | OUTPATIENT
Start: 2025-02-22 | End: 2025-02-22

## 2025-02-22 RX ORDER — KETOROLAC TROMETHAMINE 15 MG/ML
15 INJECTION, SOLUTION INTRAMUSCULAR; INTRAVENOUS ONCE
Status: COMPLETED | OUTPATIENT
Start: 2025-02-22 | End: 2025-02-22

## 2025-02-22 RX ORDER — DIPHENHYDRAMINE HYDROCHLORIDE 50 MG/ML
25 INJECTION INTRAMUSCULAR; INTRAVENOUS ONCE
Status: COMPLETED | OUTPATIENT
Start: 2025-02-22 | End: 2025-02-22

## 2025-02-22 RX ORDER — CEFTRIAXONE 2 G/50ML
2 INJECTION, SOLUTION INTRAVENOUS ONCE
Status: COMPLETED | OUTPATIENT
Start: 2025-02-22 | End: 2025-02-23

## 2025-02-22 RX ADMIN — CEFTRIAXONE 2 G: 2 INJECTION, SOLUTION INTRAVENOUS at 23:58

## 2025-02-22 RX ADMIN — METOCLOPRAMIDE 10 MG: 5 INJECTION, SOLUTION INTRAMUSCULAR; INTRAVENOUS at 23:58

## 2025-02-22 RX ADMIN — KETOROLAC TROMETHAMINE 15 MG: 15 INJECTION, SOLUTION INTRAMUSCULAR; INTRAVENOUS at 23:49

## 2025-02-22 RX ADMIN — DIPHENHYDRAMINE HYDROCHLORIDE 25 MG: 50 INJECTION, SOLUTION INTRAMUSCULAR; INTRAVENOUS at 23:58

## 2025-02-22 RX ADMIN — ORPHENADRINE CITRATE 60 MG: 60 INJECTION INTRAMUSCULAR; INTRAVENOUS at 23:51

## 2025-02-22 ASSESSMENT — PAIN - FUNCTIONAL ASSESSMENT: PAIN_FUNCTIONAL_ASSESSMENT: UNABLE TO SELF-REPORT

## 2025-02-22 ASSESSMENT — LIFESTYLE VARIABLES
HAVE YOU EVER FELT YOU SHOULD CUT DOWN ON YOUR DRINKING: NO
TOTAL SCORE: 0
EVER HAD A DRINK FIRST THING IN THE MORNING TO STEADY YOUR NERVES TO GET RID OF A HANGOVER: NO
HAVE PEOPLE ANNOYED YOU BY CRITICIZING YOUR DRINKING: NO
EVER FELT BAD OR GUILTY ABOUT YOUR DRINKING: NO

## 2025-02-22 ASSESSMENT — PAIN DESCRIPTION - DESCRIPTORS: DESCRIPTORS: OTHER (COMMENT)

## 2025-02-22 ASSESSMENT — COLUMBIA-SUICIDE SEVERITY RATING SCALE - C-SSRS
6. HAVE YOU EVER DONE ANYTHING, STARTED TO DO ANYTHING, OR PREPARED TO DO ANYTHING TO END YOUR LIFE?: NO
2. HAVE YOU ACTUALLY HAD ANY THOUGHTS OF KILLING YOURSELF?: NO
1. IN THE PAST MONTH, HAVE YOU WISHED YOU WERE DEAD OR WISHED YOU COULD GO TO SLEEP AND NOT WAKE UP?: NO

## 2025-02-22 ASSESSMENT — PAIN SCALES - GENERAL: PAINLEVEL_OUTOF10: 8

## 2025-02-22 ASSESSMENT — PAIN DESCRIPTION - LOCATION: LOCATION: HEAD

## 2025-02-22 NOTE — Clinical Note
Syeda Rao was seen and treated in our emergency department on 2/22/2025.  She may return to work on 02/25/2025.       If you have any questions or concerns, please don't hesitate to call.      Gali García, DO

## 2025-02-23 VITALS
BODY MASS INDEX: 25.74 KG/M2 | HEIGHT: 63 IN | DIASTOLIC BLOOD PRESSURE: 70 MMHG | HEART RATE: 60 BPM | OXYGEN SATURATION: 97 % | WEIGHT: 145.28 LBS | RESPIRATION RATE: 16 BRPM | SYSTOLIC BLOOD PRESSURE: 124 MMHG | TEMPERATURE: 97 F

## 2025-02-23 LAB — HOLD SPECIMEN: NORMAL

## 2025-02-23 PROCEDURE — 71275 CT ANGIOGRAPHY CHEST: CPT | Performed by: SURGERY

## 2025-02-23 PROCEDURE — 74174 CTA ABD&PLVS W/CONTRAST: CPT | Performed by: SURGERY

## 2025-02-23 PROCEDURE — 2550000001 HC RX 255 CONTRASTS: Performed by: STUDENT IN AN ORGANIZED HEALTH CARE EDUCATION/TRAINING PROGRAM

## 2025-02-23 PROCEDURE — 2500000004 HC RX 250 GENERAL PHARMACY W/ HCPCS (ALT 636 FOR OP/ED)

## 2025-02-23 RX ORDER — ONDANSETRON 4 MG/1
4 TABLET, ORALLY DISINTEGRATING ORAL EVERY 8 HOURS PRN
Qty: 20 TABLET | Refills: 0 | Status: SHIPPED | OUTPATIENT
Start: 2025-02-23 | End: 2025-03-02

## 2025-02-23 RX ORDER — NITROFURANTOIN 25; 75 MG/1; MG/1
100 CAPSULE ORAL 2 TIMES DAILY
Qty: 10 CAPSULE | Refills: 0 | Status: SHIPPED | OUTPATIENT
Start: 2025-02-23 | End: 2025-02-26 | Stop reason: ALTCHOICE

## 2025-02-23 RX ORDER — ONDANSETRON 4 MG/1
TABLET, ORALLY DISINTEGRATING ORAL
Status: COMPLETED
Start: 2025-02-23 | End: 2025-02-23

## 2025-02-23 RX ORDER — ONDANSETRON 4 MG/1
4 TABLET, ORALLY DISINTEGRATING ORAL ONCE
Status: COMPLETED | OUTPATIENT
Start: 2025-02-23 | End: 2025-02-23

## 2025-02-23 RX ORDER — NITROFURANTOIN 25; 75 MG/1; MG/1
100 CAPSULE ORAL 2 TIMES DAILY
Qty: 10 CAPSULE | Refills: 0 | Status: SHIPPED | OUTPATIENT
Start: 2025-02-23 | End: 2025-02-23

## 2025-02-23 RX ADMIN — IOHEXOL 75 ML: 350 INJECTION, SOLUTION INTRAVENOUS at 00:12

## 2025-02-23 RX ADMIN — ONDANSETRON 4 MG: 4 TABLET, ORALLY DISINTEGRATING ORAL at 02:03

## 2025-02-23 ASSESSMENT — PAIN DESCRIPTION - PROGRESSION: CLINICAL_PROGRESSION: GRADUALLY IMPROVING

## 2025-02-23 ASSESSMENT — PAIN SCALES - GENERAL: PAINLEVEL_OUTOF10: 1

## 2025-02-23 ASSESSMENT — PAIN - FUNCTIONAL ASSESSMENT: PAIN_FUNCTIONAL_ASSESSMENT: 0-10

## 2025-02-23 ASSESSMENT — PAIN DESCRIPTION - LOCATION: LOCATION: BACK

## 2025-02-23 ASSESSMENT — PAIN DESCRIPTION - PAIN TYPE: TYPE: CHRONIC PAIN

## 2025-02-23 NOTE — ED PROVIDER NOTES
History of Present Illness     History provided by: Patient and EMS  Limitations to History: Altered Mental Status  External Records Reviewed with Brief Summary:  VCD discharge summary, diagnosis of diabetes, previous UTI    HPI:  Syeda Rao is a 60 y.o. female past medical history of diabetes hypertension presents today for chest pain.  Per EMS, they were called after the patient's  stated that she was complaining of chest pain or shortness of breath shortly after taking an edible to go to bed.  Is normally taken out well, normally does not have this reaction.  Per EMS, she was initially alert and responsive, then became only responsive to sternal rub.  Was answering with 1 or 2 word responses.  Here, patient is doing the same, is responsive to pain in all 4 extremities, does occasionally answer with 1-2 word responses.  More reliably shakes head yes or no to questions.  Denies any shortness of breath, nausea, vomiting, diarrhea.  Denies any abdominal pain.  Does endorse chest pain.  Denies any numbness, weakness, tingling.  Denies any alcohol use today.    Physical Exam   Triage vitals:  T    HR    BP    RR    O2        Physical Exam  Vitals and nursing note reviewed.   Constitutional:       General: She is not in acute distress.     Appearance: Normal appearance. She is not ill-appearing or diaphoretic.   HENT:      Head: Normocephalic and atraumatic.      Mouth/Throat:      Mouth: Mucous membranes are moist.      Pharynx: No oropharyngeal exudate or posterior oropharyngeal erythema.   Eyes:      General: No scleral icterus.     Extraocular Movements: Extraocular movements intact.      Pupils: Pupils are equal, round, and reactive to light.   Cardiovascular:      Rate and Rhythm: Normal rate and regular rhythm.      Pulses: Normal pulses.      Heart sounds: Normal heart sounds. No murmur heard.     No gallop.   Pulmonary:      Effort: Pulmonary effort is normal. No respiratory distress.       Breath sounds: Normal breath sounds. No stridor. No wheezing, rhonchi or rales.   Abdominal:      General: Bowel sounds are normal. There is no distension.      Palpations: Abdomen is soft. There is no mass.      Tenderness: There is no abdominal tenderness.      Hernia: No hernia is present.   Musculoskeletal:         General: No swelling, deformity or signs of injury. Normal range of motion.      Cervical back: Normal range of motion and neck supple. No tenderness.   Skin:     General: Skin is warm.      Capillary Refill: Capillary refill takes less than 2 seconds.      Findings: No erythema, lesion or rash.   Neurological:      General: No focal deficit present.      Mental Status: She is alert. She is disoriented.      GCS: GCS eye subscore is 3. GCS verbal subscore is 5. GCS motor subscore is 5.   Psychiatric:         Mood and Affect: Mood normal.         Behavior: Behavior normal.        Medical Decision Making & ED Course   Medical Decision Makin y.o. female past medical history of diabetes hypertension who presents today for chest pain.  Patient appears to be intoxicated on initial exam, is responsive to pain, is otherwise maintaining her airway.  My concern for this being cardiac in nature is relatively low, but will get troponin BNP and EKG.  Will also get CMP and CBC.  Also get chest x-ray as well as CT of the head given the fact the patient is minimally responsive.  Patient's workup here does not demonstrate any significant abnormality as of yet, CT head does not demonstrate any evidence of significant bleed.  Patient serum alcohol salicylate and Tylenol were negative.  Patient be signed out to the oncoming team pending urine drug screen urinalysis and either return to baseline or final disposition.  ----      Differential diagnoses considered include but are not limited to: altered mental status, intoxication, ICH, posterior stroke     Social Determinants of Health which Significantly Impact Care:  None identified     EKG Independent Interpretation:  EKG shows a normal rate and rhythm, normal axis, normal intervals. And normal ST and T wave pattern with no evidence of acute ischemia or other acute findings    Independent Result Review and Interpretation: Relevant laboratory and radiographic results were reviewed and independently interpreted by myself.  As necessary, they are commented on in the ED Course.    Chronic conditions affecting the patient's care: As documented above in [facility]    The patient was discussed with the following consultants/services: None    Care Considerations: As documented above in [facility]    ED Course:  ED Course as of 02/22/25 2127   Sat Feb 22, 2025 2043 60-year-old presents to the emergency department altered mental status.  Supposedly she endorsed chest pain and shortness of breath an hour prior to arrival.  She arrived and awakes to painful stimuli but then falls back to sleep.  She does not appear to be in acute distress.  There was report that may have gotten into extra marijuana Gummies.  Pupils are equal and reactive and midline.  She localizes to pain with both upper extremities equally.  Does not appear to have a focal neurologic deficit.  Patient will continue to be observed until return to baseline mental status. [HD]   2107 Significant other arrived.  He states that she was fine all day today and clean the whole house.  She recently had some of her antidepressants changed.  States that he went and took a nap and when he woke up around 5 she was on the bed fully dressed without the covers on and was unresponsive.  She told him that she is talking to God and to call 911.  She said 911 3 times.  On the third time he called 911.  He states that she did endorse chest pain.  Outside of that he said she was fine all day today.  She took half of a marijuana gummy which she states is not abnormal for her.  He is concerned that maybe this is something to do with her recent medication  changes. [HD]      ED Course User Index  [HD] Gali García DO         Diagnoses as of 02/22/25 2127   Somnolence     Disposition   Signed out to attending, see ED course for further treatment evaluation and final disposition    Procedures   Procedures    Patient seen and discussed with ED attending physician.    Jeremy Hodges MD  Emergency Medicine       Jeremy Hodges MD  Resident  02/22/25 2129

## 2025-02-25 LAB — BACTERIA UR CULT: ABNORMAL

## 2025-02-26 ENCOUNTER — TELEPHONE (OUTPATIENT)
Dept: PHARMACY | Facility: HOSPITAL | Age: 60
End: 2025-02-26
Payer: MEDICARE

## 2025-02-26 NOTE — PROGRESS NOTES
EDPD Note: Rapid Result Review    I reviewed Syeda Rao 's chart regarding a positive urine culture/result that was taken during their recent emergency room visit. The patient was not told about these results prior to leaving the emergency department. Therefore, patient was contacted and given appropriate education.     Patient presented to ED 2/22 with chest pain and arms, no urinary symptoms. She was discharged on Macrobid 100 mg bid x 5 days. At the time of this call, endorses no urinary symptoms. Appropriate to discontinue antibiotics at this time as treatment is not indicated. Patient in agreement, no questions.    Susceptibility data from last 90 days.  Collected Specimen Info Organism Amoxicillin/Clavulanate Ampicillin Ampicillin/Sulbactam Cefazolin Cefazolin (uncomplicated UTIs only) Ceftriaxone Ciprofloxacin Gentamicin Nitrofurantoin Piperacillin/Tazobactam   02/22/25 Urine from Straight Catheter Escherichia coli  S  R  I  I  S  S  R  S  S  S     Collected Specimen Info Organism Trimethoprim/Sulfamethoxazole   02/22/25 Urine from Straight Catheter Escherichia coli  S     Admission on 02/22/2025, Discharged on 02/23/2025   Component Date Value Ref Range Status    Ventricular Rate 02/22/2025 77  BPM Preliminary    Atrial Rate 02/22/2025 77  BPM Preliminary    OH Interval 02/22/2025 160  ms Preliminary    QRS Duration 02/22/2025 88  ms Preliminary    QT Interval 02/22/2025 410  ms Preliminary    QTC Calculation(Bazett) 02/22/2025 463  ms Preliminary    P Axis 02/22/2025 70  degrees Preliminary    R Axis 02/22/2025 6  degrees Preliminary    T Axis 02/22/2025 45  degrees Preliminary    QRS Count 02/22/2025 12  beats Preliminary    Q Onset 02/22/2025 219  ms Preliminary    P Onset 02/22/2025 139  ms Preliminary    P Offset 02/22/2025 194  ms Preliminary    T Offset 02/22/2025 424  ms Preliminary    QTC Fredericia 02/22/2025 445  ms Preliminary    WBC 02/22/2025 5.9  4.4 - 11.3 x10*3/uL Final    nRBC  02/22/2025 0.0  0.0 - 0.0 /100 WBCs Final    RBC 02/22/2025 4.78  4.00 - 5.20 x10*6/uL Final    Hemoglobin 02/22/2025 13.6  12.0 - 16.0 g/dL Final    Hematocrit 02/22/2025 41.7  36.0 - 46.0 % Final    MCV 02/22/2025 87  80 - 100 fL Final    MCH 02/22/2025 28.5  26.0 - 34.0 pg Final    MCHC 02/22/2025 32.6  32.0 - 36.0 g/dL Final    RDW 02/22/2025 14.6 (H)  11.5 - 14.5 % Final    Platelets 02/22/2025 223  150 - 450 x10*3/uL Final    Neutrophils % 02/22/2025 63.4  40.0 - 80.0 % Final    Immature Granulocytes %, Automated 02/22/2025 0.2  0.0 - 0.9 % Final    Immature Granulocyte Count (IG) includes promyelocytes, myelocytes and metamyelocytes but does not include bands. Percent differential counts (%) should be interpreted in the context of the absolute cell counts (cells/UL).    Lymphocytes % 02/22/2025 26.8  13.0 - 44.0 % Final    Monocytes % 02/22/2025 7.4  2.0 - 10.0 % Final    Eosinophils % 02/22/2025 1.5  0.0 - 6.0 % Final    Basophils % 02/22/2025 0.7  0.0 - 2.0 % Final    Neutrophils Absolute 02/22/2025 3.71  1.20 - 7.70 x10*3/uL Final    Percent differential counts (%) should be interpreted in the context of the absolute cell counts (cells/uL).    Immature Granulocytes Absolute, Au* 02/22/2025 0.01  0.00 - 0.70 x10*3/uL Final    Lymphocytes Absolute 02/22/2025 1.57  1.20 - 4.80 x10*3/uL Final    Monocytes Absolute 02/22/2025 0.43  0.10 - 1.00 x10*3/uL Final    Eosinophils Absolute 02/22/2025 0.09  0.00 - 0.70 x10*3/uL Final    Basophils Absolute 02/22/2025 0.04  0.00 - 0.10 x10*3/uL Final    Glucose 02/22/2025 100 (H)  74 - 99 mg/dL Final    Sodium 02/22/2025 138  136 - 145 mmol/L Final    Potassium 02/22/2025 3.5  3.5 - 5.3 mmol/L Final    Chloride 02/22/2025 103  98 - 107 mmol/L Final    Bicarbonate 02/22/2025 26  21 - 32 mmol/L Final    Anion Gap 02/22/2025 13  10 - 20 mmol/L Final    Urea Nitrogen 02/22/2025 16  6 - 23 mg/dL Final    Creatinine 02/22/2025 0.84  0.50 - 1.05 mg/dL Final    eGFR 02/22/2025 79   >60 mL/min/1.73m*2 Final    Calculations of estimated GFR are performed using the 2021 CKD-EPI Study Refit equation without the race variable for the IDMS-Traceable creatinine methods.  https://jasn.asnjournals.org/content/early/2021/09/22/ASN.3882506464    Calcium 02/22/2025 9.8  8.6 - 10.3 mg/dL Final    Albumin 02/22/2025 4.1  3.4 - 5.0 g/dL Final    Alkaline Phosphatase 02/22/2025 109  33 - 136 U/L Final    Total Protein 02/22/2025 8.1  6.4 - 8.2 g/dL Final    AST 02/22/2025 25  9 - 39 U/L Final    Bilirubin, Total 02/22/2025 0.3  0.0 - 1.2 mg/dL Final    ALT 02/22/2025 25  7 - 45 U/L Final    Patients treated with Sulfasalazine may generate falsely decreased results for ALT.    Magnesium 02/22/2025 2.00  1.60 - 2.40 mg/dL Final    BNP 02/22/2025 39  0 - 99 pg/mL Final    POCT pH, Venous 02/22/2025 7.39  7.33 - 7.43 pH Final    POCT pCO2, Venous 02/22/2025 47  41 - 51 mm Hg Final    POCT pO2, Venous 02/22/2025 41  35 - 45 mm Hg Final    POCT SO2, Venous 02/22/2025 73  45 - 75 % Final    POCT Oxy Hemoglobin, Venous 02/22/2025 67.9  45.0 - 75.0 % Final    POCT Hematocrit Calculated, Venous 02/22/2025 42.0  36.0 - 46.0 % Final    POCT Sodium, Venous 02/22/2025 137  136 - 145 mmol/L Final    POCT Potassium, Venous 02/22/2025 3.5  3.5 - 5.3 mmol/L Final    POCT Chloride, Venous 02/22/2025 107  98 - 107 mmol/L Final    POCT Ionized Calicum, Venous 02/22/2025 1.21  1.10 - 1.33 mmol/L Final    POCT Glucose, Venous 02/22/2025 105 (H)  74 - 99 mg/dL Final    POCT Lactate, Venous 02/22/2025 1.2  0.4 - 2.0 mmol/L Final    POCT Base Excess, Venous 02/22/2025 2.8  -2.0 - 3.0 mmol/L Final    POCT HCO3 Calculated, Venous 02/22/2025 28.5 (H)  22.0 - 26.0 mmol/L Final    POCT Hemoglobin, Venous 02/22/2025 14.0  12.0 - 16.0 g/dL Final    POCT Anion Gap, Venous 02/22/2025 5.0 (L)  10.0 - 25.0 mmol/L Final    Patient Temperature 02/22/2025    Final    NOTE: Patient Results are Not Corrected for Temperature    FiO2 02/22/2025 21  %  Final    Amphetamine Screen, Urine 02/22/2025 Presumptive Positive (A)  Presumptive Negative Final    CUTOFF LEVEL: 500 NG/ML   Cross-reactivity has been reported with high concentrations   of the following drugs: buproprion, chloroquine, chlorpromazine,   ephedrine, mephentermine, fenfluramine, phentermine,   phenylpropanolamine, pseudoephedrine, and propranolol.    Barbiturate Screen, Urine 02/22/2025 Presumptive Negative  Presumptive Negative Final    CUTOFF LEVEL: 200 NG/ML    Benzodiazepines Screen, Urine 02/22/2025 Presumptive Negative  Presumptive Negative Final    CUTOFF LEVEL: 200 NG/ML    Cannabinoid Screen, Urine 02/22/2025 Presumptive Positive (A)  Presumptive Negative Final    CUTOFF LEVEL: 50 NG/ML    Cocaine Metabolite Screen, Urine 02/22/2025 Presumptive Negative  Presumptive Negative Final    CUTOFF LEVEL: 150 NG/ML    Fentanyl Screen, Urine 02/22/2025 Presumptive Negative  Presumptive Negative Final    CUTOFF LEVEL: 5 NG/ML    Opiate Screen, Urine 02/22/2025 Presumptive Negative  Presumptive Negative Final    CUTOFF LEVEL: 300 NG/ML  The opiate screen does not detect fentanyl, meperidine, or   tramadol. Oxycodone is not consistently detected (refer to  Oxycodone Screen, Urine result).    Oxycodone Screen, Urine 02/22/2025 Presumptive Negative  Presumptive Negative Final    CUTOFF LEVEL: 100 NG/ML  This test will accurately detect both oxycodone and oxymorphone.    PCP Screen, Urine 02/22/2025 Presumptive Negative  Presumptive Negative Final    CUTOFF LEVEL:  25 NG/ML  Cross-reactivity has been reported with dextromethorphan.    Methadone Screen, Urine 02/22/2025 Presumptive Negative  Presumptive Negative Final    CUTOFF LEVEL: 150 NG/ML  The metabolite L-alpha-acetylmethadol (LAAM) is not  detected by this method in concentrations that would  be found in the urine of patients on LAAM therapy.    Acetaminophen 02/22/2025 <10.0  10.0 - 30.0 ug/mL Final    Salicylate  02/22/2025 <3  4 - 20 mg/dL Final     Alcohol 02/22/2025 <10  <=10 mg/dL Final    Color, Urine 02/22/2025 Light-Yellow  Light-Yellow, Yellow, Dark-Yellow Final    Appearance, Urine 02/22/2025 Clear  Clear Final    Specific Gravity, Urine 02/22/2025 1.010  1.005 - 1.035 Final    pH, Urine 02/22/2025 6.5  5.0, 5.5, 6.0, 6.5, 7.0, 7.5, 8.0 Final    Protein, Urine 02/22/2025 NEGATIVE  NEGATIVE, 10 (TRACE), 20 (TRACE) mg/dL Final    Glucose, Urine 02/22/2025 Normal  Normal mg/dL Final    Blood, Urine 02/22/2025 NEGATIVE  NEGATIVE mg/dL Final    Ketones, Urine 02/22/2025 NEGATIVE  NEGATIVE mg/dL Final    Bilirubin, Urine 02/22/2025 NEGATIVE  NEGATIVE mg/dL Final    Urobilinogen, Urine 02/22/2025 Normal  Normal mg/dL Final    Nitrite, Urine 02/22/2025 2+ (A)  NEGATIVE Final    Leukocyte Esterase, Urine 02/22/2025 25 Lee/uL (A)  NEGATIVE Final    Extra Tube 02/22/2025 Hold for add-ons.   Final    Auto resulted.    Troponin I, High Sensitivity 02/22/2025 4  0 - 13 ng/L Final    POCT Glucose 02/22/2025 117 (H)  74 - 99 mg/dL Final    Troponin I, High Sensitivity 02/22/2025 5  0 - 13 ng/L Final    WBC, Urine 02/22/2025 1-5  1-5, NONE /HPF Final    RBC, Urine 02/22/2025 1-2  NONE, 1-2, 3-5 /HPF Final    Mucus, Urine 02/22/2025 FEW  Reference range not established. /LPF Final    Urine Culture 02/22/2025 20,000 - 80,000 CFU/mL Escherichia coli (A)   Final    Plus growth of clinically insignificant bacterial bienvenido.       No further follow up needed from EDPD Team.     If there are any other questions for the ED Post-Discharge Culture Follow Up Team, please contact [phone]. Fax: [phone].    Praveen Wilkerson, LcD

## 2025-02-27 DIAGNOSIS — B99.9 RECURRENT INFECTIONS: Primary | ICD-10-CM

## 2025-02-28 LAB
ATRIAL RATE: 77 BPM
P AXIS: 70 DEGREES
P OFFSET: 194 MS
P ONSET: 139 MS
PR INTERVAL: 160 MS
Q ONSET: 219 MS
QRS COUNT: 12 BEATS
QRS DURATION: 88 MS
QT INTERVAL: 410 MS
QTC CALCULATION(BAZETT): 463 MS
QTC FREDERICIA: 445 MS
R AXIS: 6 DEGREES
T AXIS: 45 DEGREES
T OFFSET: 424 MS
VENTRICULAR RATE: 77 BPM

## 2025-03-10 LAB
ATRIAL RATE: 74 BPM
P AXIS: -7 DEGREES
P OFFSET: 190 MS
P ONSET: 133 MS
PR INTERVAL: 172 MS
Q ONSET: 219 MS
QRS COUNT: 13 BEATS
QRS DURATION: 88 MS
QT INTERVAL: 436 MS
QTC CALCULATION(BAZETT): 483 MS
QTC FREDERICIA: 467 MS
R AXIS: 65 DEGREES
T AXIS: 30 DEGREES
T OFFSET: 437 MS
VENTRICULAR RATE: 74 BPM

## 2025-04-18 ENCOUNTER — APPOINTMENT (OUTPATIENT)
Dept: PAIN MEDICINE | Facility: CLINIC | Age: 60
End: 2025-04-18
Payer: MEDICARE

## 2025-06-25 ENCOUNTER — TELEPHONE (OUTPATIENT)
Facility: CLINIC | Age: 60
End: 2025-06-25

## 2025-06-25 ENCOUNTER — APPOINTMENT (OUTPATIENT)
Dept: OBSTETRICS AND GYNECOLOGY | Facility: CLINIC | Age: 60
End: 2025-06-25
Payer: MEDICARE

## 2025-06-25 VITALS
WEIGHT: 145 LBS | SYSTOLIC BLOOD PRESSURE: 130 MMHG | HEIGHT: 63 IN | DIASTOLIC BLOOD PRESSURE: 80 MMHG | BODY MASS INDEX: 25.69 KG/M2

## 2025-06-25 DIAGNOSIS — N95.8 GENITOURINARY SYNDROME OF MENOPAUSE: ICD-10-CM

## 2025-06-25 DIAGNOSIS — Z12.31 ENCOUNTER FOR SCREENING MAMMOGRAM FOR MALIGNANT NEOPLASM OF BREAST: ICD-10-CM

## 2025-06-25 DIAGNOSIS — Z01.419 ENCOUNTER FOR GYNECOLOGICAL EXAMINATION WITHOUT ABNORMAL FINDING: Primary | ICD-10-CM

## 2025-06-25 PROCEDURE — 99396 PREV VISIT EST AGE 40-64: CPT | Performed by: OBSTETRICS & GYNECOLOGY

## 2025-06-25 PROCEDURE — 3008F BODY MASS INDEX DOCD: CPT | Performed by: OBSTETRICS & GYNECOLOGY

## 2025-06-25 PROCEDURE — G2211 COMPLEX E/M VISIT ADD ON: HCPCS | Performed by: OBSTETRICS & GYNECOLOGY

## 2025-06-25 PROCEDURE — 88175 CYTOPATH C/V AUTO FLUID REDO: CPT

## 2025-06-25 PROCEDURE — 87626 HPV SEP HI-RSK TYP&POOL RSLT: CPT

## 2025-06-25 RX ORDER — PRASTERONE 6.5 MG/1
6.5 INSERT VAGINAL DAILY
Qty: 28 EACH | Refills: 11 | Status: SHIPPED | OUTPATIENT
Start: 2025-06-25

## 2025-06-25 RX ORDER — PRASTERONE 6.5 MG/1
6.5 INSERT VAGINAL DAILY
Qty: 28 EACH | Refills: 11 | Status: SHIPPED | OUTPATIENT
Start: 2025-06-25 | End: 2025-06-25 | Stop reason: SDUPTHER

## 2025-06-25 NOTE — TELEPHONE ENCOUNTER
Patient contacted the office, the prescription for Intrarosa was not covered at [facility].  I did replace the prescription to [facility] pharmacy in [place], [place] to assist in coverage.  She has 2 weeks of samples.

## 2025-06-25 NOTE — PROGRESS NOTES
Subjective   Syeda Rao is a 60 y.o. female here for GYN care.  She was last seen here 2022 with symptoms of genital HSV.  No current concerns.    She has no postmenopausal bleeding or discharge.  No dysuria or change in bowel habits.  She is current on her colonoscopy.  We discussed occasional leakage of urine immediately after urination.    Her last Pap May 2019 was normal and HPV negative.  History of LGSIL in 2016, HPV negative.    She does mention brain fog and night sweats.  She is forgetful.  She has decreased libido.  She denies dryness.  She has no change in partner, she is sexually active with her .   She does have a CT scan 2/3/25 that showed a calcified fibroid uterus, the largest fibroid of 2.2 cm.    Personal health questionnaire reviewed: yes.     Gynecologic History  No LMP recorded. Patient is postmenopausal.  Contraception: post menopausal status  Last Pap: 19. Results were: normal  Last mammogram: 24. Results were: normal    Obstetric History  OB History    Para Term  AB Living   2 2    2   SAB IAB Ectopic Multiple Live Births             # Outcome Date GA Lbr Elder/2nd Weight Sex Type Anes PTL Lv   2 Para            1 Para                Objective   Constitutional: Alert and in no acute distress. Well developed, well nourished.   Head and Face: Head and face: Normal.    Eyes: Normal external exam - nonicteric sclera, extraocular movements intact (EOMI) and no ptosis.   Neck: No neck asymmetry. Supple. Thyroid not enlarged and there were no palpable thyroid nodules.    Pulmonary: No respiratory distress.   Chest: Breasts: Normal appearance, no nipple discharge and no skin changes. Palpation of breasts and axillae: No palpable mass and no axillary lymphadenopathy.   Abdomen: Soft nontender; no abdominal mass palpated. No organomegaly. No hernias.   Genitourinary: External genitalia: Normal. No inguinal lymphadenopathy. Bartholin's Urethral and Skenes  Glands: Normal. Urethra: Normal.  Bladder: Normal on palpation. Vagina: Normal. Cervix: Normal.  Uterus: Normal.  Right Adnexa/parametria: Normal.  Left Adnexa/parametria: Normal.  Inspection of Perianal Area: Normal.   Musculoskeletal: No joint swelling seen, normal movements of all extremities.   Skin: Normal skin color and pigmentation, normal skin turgor, and no rash.   Neurologic: Non-focal. Grossly intact.   Psychiatric: Alert and oriented x 3. Affect normal to patient baseline. Mood: Appropriate.  Physical Exam     Assessment/Plan   This is a 60-year-old female with a normal exam.  A Pap smear was sent.    Her routine mammogram was ordered with tomosynthesis.  We discussed some of her menopausal symptoms of night sweats and brain fog, low libido.  At 60 she would not be considered an ideal candidate for hormone replacement therapy.  We discussed other options, and she was given a sample of Intrarosa, a vaginal suppository.  The order was placed to [facility]vertise, as it was not covered by [facility].  She will call me with concerns.    Medicare recommends follow-up every 2 years, or sooner as needed.    Education reviewed: self breast exams.  Mammogram ordered.

## 2025-06-25 NOTE — TELEPHONE ENCOUNTER
Patient advised her medication, prasterone, DHEA, (Intrarosa) 6.5 mg insert is not covered through her pharmacy,   [id number] [address] [address]       Patient advised that you and the patient were speaking about going through a .

## 2025-06-26 NOTE — TELEPHONE ENCOUNTER
Called patient LVM medication was sent to [facility] pharmacy    English Sacha

## 2025-07-08 ENCOUNTER — APPOINTMENT (OUTPATIENT)
Dept: RADIOLOGY | Facility: CLINIC | Age: 60
End: 2025-07-08
Payer: MEDICARE

## 2025-07-11 ENCOUNTER — APPOINTMENT (OUTPATIENT)
Dept: RADIOLOGY | Facility: CLINIC | Age: 60
End: 2025-07-11
Payer: MEDICARE

## 2026-07-29 ENCOUNTER — APPOINTMENT (OUTPATIENT)
Dept: OBSTETRICS AND GYNECOLOGY | Facility: CLINIC | Age: 61
End: 2026-07-29
Payer: MEDICARE